# Patient Record
Sex: MALE
[De-identification: names, ages, dates, MRNs, and addresses within clinical notes are randomized per-mention and may not be internally consistent; named-entity substitution may affect disease eponyms.]

---

## 2018-02-22 ENCOUNTER — HOSPITAL ENCOUNTER (OUTPATIENT)
Dept: HOSPITAL 14 - H.ER | Age: 83
Setting detail: OBSERVATION
LOS: 1 days | Discharge: HOME | End: 2018-02-23
Attending: INTERNAL MEDICINE | Admitting: INTERNAL MEDICINE
Payer: MEDICARE

## 2018-02-22 DIAGNOSIS — E66.9: ICD-10-CM

## 2018-02-22 DIAGNOSIS — T78.3XXA: Primary | ICD-10-CM

## 2018-02-22 DIAGNOSIS — J44.9: ICD-10-CM

## 2018-02-22 DIAGNOSIS — I10: ICD-10-CM

## 2018-02-22 DIAGNOSIS — C61: ICD-10-CM

## 2018-02-22 DIAGNOSIS — E86.0: ICD-10-CM

## 2018-02-22 DIAGNOSIS — T46.4X5A: ICD-10-CM

## 2018-02-22 DIAGNOSIS — Z23: ICD-10-CM

## 2018-02-22 DIAGNOSIS — Z85.46: ICD-10-CM

## 2018-02-22 LAB
BASOPHILS # BLD AUTO: 0.1 K/UL (ref 0–0.2)
BASOPHILS NFR BLD: 0.6 % (ref 0–2)
BUN SERPL-MCNC: 21 MG/DL (ref 9–20)
CALCIUM SERPL-MCNC: 9.2 MG/DL (ref 8.4–10.2)
EOSINOPHIL # BLD AUTO: 0.1 K/UL (ref 0–0.7)
EOSINOPHIL NFR BLD: 1.5 % (ref 0–4)
ERYTHROCYTE [DISTWIDTH] IN BLOOD BY AUTOMATED COUNT: 13.1 % (ref 11.5–14.5)
GFR NON-AFRICAN AMERICAN: 58
HGB BLD-MCNC: 14.8 G/DL (ref 12–18)
LYMPHOCYTES # BLD AUTO: 2.8 K/UL (ref 1–4.3)
LYMPHOCYTES NFR BLD AUTO: 32.8 % (ref 20–40)
MCH RBC QN AUTO: 33.1 PG (ref 27–31)
MCHC RBC AUTO-ENTMCNC: 34 G/DL (ref 33–37)
MCV RBC AUTO: 97.5 FL (ref 80–94)
MONOCYTES # BLD: 0.5 K/UL (ref 0–0.8)
MONOCYTES NFR BLD: 6.2 % (ref 0–10)
NEUTROPHILS # BLD: 5 K/UL (ref 1.8–7)
NEUTROPHILS NFR BLD AUTO: 58.9 % (ref 50–75)
NRBC BLD AUTO-RTO: 0.1 % (ref 0–0)
PLATELET # BLD: 204 K/UL (ref 130–400)
PMV BLD AUTO: 8.7 FL (ref 7.2–11.7)
RBC # BLD AUTO: 4.47 MIL/UL (ref 4.4–5.9)
WBC # BLD AUTO: 8.5 K/UL (ref 4.8–10.8)

## 2018-02-22 PROCEDURE — 96375 TX/PRO/DX INJ NEW DRUG ADDON: CPT

## 2018-02-22 PROCEDURE — 85025 COMPLETE CBC W/AUTO DIFF WBC: CPT

## 2018-02-22 PROCEDURE — 96374 THER/PROPH/DIAG INJ IV PUSH: CPT

## 2018-02-22 PROCEDURE — 99285 EMERGENCY DEPT VISIT HI MDM: CPT

## 2018-02-22 PROCEDURE — 93005 ELECTROCARDIOGRAM TRACING: CPT

## 2018-02-22 PROCEDURE — 94640 AIRWAY INHALATION TREATMENT: CPT

## 2018-02-22 PROCEDURE — 96376 TX/PRO/DX INJ SAME DRUG ADON: CPT

## 2018-02-22 PROCEDURE — 80048 BASIC METABOLIC PNL TOTAL CA: CPT

## 2018-02-22 PROCEDURE — 96372 THER/PROPH/DIAG INJ SC/IM: CPT

## 2018-02-22 RX ADMIN — METHYLPREDNISOLONE SODIUM SUCCINATE SCH MG: 40 INJECTION, POWDER, FOR SOLUTION INTRAMUSCULAR; INTRAVENOUS at 23:41

## 2018-02-22 NOTE — CP.PCM.HP
History of Present Illness





- History of Present Illness


History of Present Illness: 


PMD: Alona Akbar MD





Chief complaint: Swollen upper lip with rash to Trunk





The Patient was seen and examined in the ED





HPI: 82 years old male, living Alone with hx of HTN treated with Lisinopril, 

Prostate Ca and Asthma, came with one day of swelling of the upper lip, not 

associated with difficulty to breath, swallow nor speak. The tongue nor throat 

were swollen. He did refer a pruritus rash about his neck and upper chest with 

mild wheezes. He refers no new foods nor medication and has never had lip 

swelling in the past. In the ED he was treated with Epinephrine , Benadryl, 

Methylprednisolone  and Pepcid which showed some improvement initially. The 

plan was to discharge the patient with plan of cessation of the ACE inhibitor 

and to start Norvasc, but because of persistence of the upper lip swelling and 

and renewed enlargening, decision was made to maintain the patient in Hospital 

for Observation. 








PMH: HTN; Asthma; Prostate Cancer





PSH: Umbilical Hernia repair





SH: Denies Smoking: No Alcohol use; no illegal drug use; Live Alone





FH: State: no known family hx





Allergies NKDA





Medication: Reviewed

















Present on Admission





- Present on Admission


Any Indicators Present on Admission: No


History of DVT/PE: No


History of Uncontrolled Diabetes: No


Urinary Catheter: No


Decubitus Ulcer Present: No





Review of Systems





- Constitutional


Constitutional: absent: Anorexia, Chills, Fatigue, Fever, Headache, Lethargy





- EENT


Eyes: absent: Diplopia, Floaters, Requires Corrective Lenses, Sees Flashes


Ears: absent: Decreased Hearing, Ear Discharge, Ear Pain


Nose/Mouth/Throat: Lip Swelling.  absent: Epistaxis, Nasal Congestion, Nasal 

Discharge





- Cardiovascular


Cardiovascular: Dyspnea.  absent: Chest Pain, Leg Edema, Pedal Edema


Additional comments: 





SOB on exertion





- Respiratory


Respiratory: Dyspnea, Wheezing.  absent: Cough





- Gastrointestinal


Gastrointestinal: absent: Constipation, Diarrhea, Nausea, Vomiting





- Genitourinary


Genitourinary: absent: Dysuria, Flank Pain, Hematuria, Urinary Frequency





- Musculoskeletal


Musculoskeletal: absent: Back Pain, Muscle Cramps, Myalgias





- Integumentary


Integumentary: Rash.  absent: Pruritus, Skin Ulcer, Sores, Striae


Additional comments: 





Upper lip swelling


Pruritic rash on upper chest and neck





- Neurological


Neurological: absent: Confusion, Dizziness, Focal Weakness, Weakness





- Psychiatric


Psychiatric: absent: Anxiety, Depression, Panic Attacks





- Endocrine


Endocrine: absent: Palpitations, Polydipsia, Polyphagia, Polyuria





- Hematologic/Lymphatic


Hematologic: absent: Easy Bleeding, Easy Bruising





Past Patient History





- Past Medical History & Family History


Past Medical History?: Yes





- Past Social History


Smoking Status: Never Smoked


Chewing Tobacco Use: No


Cigar Use: No


Alcohol: None


Drugs: Denies


Home Situation {Lives}: Alone





- CARDIAC


Hx Hypertension: Yes





- PULMONARY


Hx Chronic Obstructive Pulmonary Disease (COPD): Yes





- NEUROLOGICAL


Hx Neurological Disorder: No





- HEENT


Hx HEENT Problems: No





- RENAL


Hx Chronic Kidney Disease: No





- ENDOCRINE/METABOLIC


Hx Endocrine Disorders: No





- HEMATOLOGICAL/ONCOLOGICAL


Hx Blood Disorders: No





- INTEGUMENTARY


Hx Dermatological Problems: No





- MUSCULOSKELETAL/RHEUMATOLOGICAL


Hx Musculoskeletal Disorders: No





- GASTROINTESTINAL


Hx Gastrointestinal Disorders: No





- GENITOURINARY/GYNECOLOGICAL


Hx Genitourinary Disorders: No





- PSYCHIATRIC


Hx Psychophysiologic Disorder: No


Hx Substance Use: No





- SURGICAL HISTORY


Hx Herniorrhaphy: Yes





- ANESTHESIA


Hx Anesthesia: Yes


Hx Anesthesia Reactions: No





Meds


Allergies/Adverse Reactions: 


 Allergies











Allergy/AdvReac Type Severity Reaction Status Date / Time


 


No Known Allergies Allergy   Verified 02/22/18 14:25














Physical Exam





- Constitutional


Appears: No Acute Distress





- Head Exam


Head Exam: ATRAUMATIC, NORMAL INSPECTION, NORMOCEPHALIC





- Eye Exam


Eye Exam: EOMI, Normal appearance


Pupil Exam: NORMAL ACCOMODATION, PERRL





- ENT Exam


ENT Exam: Mucous Membranes Moist


Additional comments: 





Mouth with upper lip swollen, non tender, no erythemia.





- Neck Exam


Neck exam: Positive for: Full Rom, Normal Inspection.  Negative for: 

Lymphadenopathy, Tenderness





- Respiratory Exam


Additional comments: 





Mild wheezes at both lung fields


No rales nor rhonchi





- Cardiovascular Exam


Cardiovascular Exam: REGULAR RHYTHM, RRR, +S1, +S2


Additional comments: 





Systolic murmur III/VI at the base





- GI/Abdominal Exam


Additional comments: 





Obese abdomen, firm, non-tender, no viceromegales





- Rectal Exam


Rectal Exam: Deferred





- Extremities Exam


Extremities exam: Positive for: full ROM, normal inspection.  Negative for: 

calf tenderness, joint swelling, pedal edema





- Back Exam


Back exam: NORMAL INSPECTION.  absent: CVA tenderness (L), CVA tenderness (R)





- Neurological Exam


Neurological exam: Alert, CN II-XII Intact, Oriented x3, Reflexes Normal





- Psychiatric Exam


Psychiatric exam: Normal Affect, Normal Mood





- Skin


Skin Exam: Dry, Intact, Normal Color, Rash, Warm


Additional comments: 





Erythematos rash on trunk as per ED not seen by me





Results





- Vital Signs


Recent Vital Signs: 





 Last Vital Signs











Temp  98.0 F   02/22/18 14:25


 


Pulse  76   02/22/18 18:33


 


Resp  18   02/22/18 18:33


 


BP  137/88   02/22/18 18:33


 


Pulse Ox  99   02/22/18 19:28














- Labs


Result Diagrams: 


 02/22/18 17:25





 02/22/18 17:25


Labs: 





 Laboratory Results - last 24 hr











  02/22/18 02/22/18





  17:25 17:25


 


WBC  8.5 


 


RBC  4.47 


 


Hgb  14.8 


 


Hct  43.6 


 


MCV  97.5 H 


 


MCH  33.1 H 


 


MCHC  34.0 


 


RDW  13.1 


 


Plt Count  204 


 


MPV  8.7 


 


Neut % (Auto)  58.9 


 


Lymph % (Auto)  32.8 


 


Mono % (Auto)  6.2 


 


Eos % (Auto)  1.5 


 


Baso % (Auto)  0.6 


 


Neut # (Auto)  5.0 


 


Lymph # (Auto)  2.8 


 


Mono # (Auto)  0.5 


 


Eos # (Auto)  0.1 


 


Baso # (Auto)  0.1 


 


Sodium   138


 


Potassium   4.5


 


Chloride   101


 


Carbon Dioxide   24


 


Anion Gap   18


 


BUN   21 H


 


Creatinine   1.2


 


Est GFR ( Amer)   > 60


 


Est GFR (Non-Af Amer)   58


 


Random Glucose   82


 


Calcium   9.2














Assessment & Plan





- Assessment and Plan (Free Text)


Assessment: 





#. Angeoedema


#. Dehydration


#. HTN


#. Asthma


#. Prostate Cancer


Plan: 


82 years old male, living Alone with hx of HTN treated with Lisinopril, 

Prostate Ca and Asthma, came with one day of swelling of the upper lip, with a 

pruritus rash about his neck and upper chest with mild wheezes. In the ED he 

was treated with Epinephrine , Benadryl, Methylprednisolone  and Pepcid which 

showed some improvement initially, but because of persistence of the upper lip 

swelling and renewed enlargening, decision was made to maintain the patient in 

Hospital for Observation. 





#. Angeoedema with signs of an Allergic reaction. consider ACE inhibitor as the 

cause of the Angioedema


- Benadryl


- Methylprednisone


- Monitor Blood pressure, breathing andswallowing





#. Dehydration


- IV Fluid with D5/0.9NS





#. HTN


- D/C Lisinopril


- Norvasc


- Follow blood pressures





#. Asthma


- Albuterol





#. Prostate Cancer


- Continue Casodex





3. DVT Prophylaxis with Lovenox





#. Code Status: Full





- Date & Time


Date: 02/22/18


Time: 21:20

## 2018-02-22 NOTE — ED PDOC
HPI: Allergic Reaction


Time Seen by Provider: 02/22/18 14:36


Chief Complaint (Nursing): Allergic Reaction


Chief Complaint (Provider): Allergic reaction


History Per: Patient


History/Exam Limitations: no limitations


Onset/Duration Of Symptoms: Hrs (this morning)


Current Symptoms Are (Timing): Still Present


Associated Symptoms: Skin Rash (trunk), Swelling (upper lip), Itching, Redness (

trunk).  denies: Trouble Swallowing, Chest Pain


Home/EMS Treatment: None


Additional Complaint(s): 





Ryan Velasquez is an 82 year old male, with a past medical history of 

hypertension, who presents to the emergency department complaining of upper lip 

swelling and itchy rash on trunk onset since this morning. Patient states he 

woke up and noticed the swelling and redness on trunk. He denies drinking, 

eating or using anything new. Patient reports he is currently taking 

lisinopril. He denies any tongue or throat swelling, difficulty breathing or 

swallowing, chest pain, cough or other medical complaints.





PMD: Dr. Raffy Sage ([facility])





Past Medical History


Reviewed: Historical Data, Nursing Documentation, Vital Signs


Vital Signs: 


 Last Vital Signs











Temp  98.0 F   02/22/18 14:25


 


Pulse  88   02/22/18 15:09


 


Resp  18   02/22/18 15:09


 


BP  175/113 H  02/22/18 14:25


 


Pulse Ox  99   02/22/18 15:09














- Medical History


PMH: COPD, HTN





- Surgical History


Surgical History: Hernia Repair





- Family History


Family History: States: Unknown Family Hx





- Social History


Current smoker - smoking cessation education provided: No


Alcohol: None


Drugs: Denies





- Home Medications


Home Medications: 


 Ambulatory Orders











 Medication  Instructions  Recorded


 


Albuterol HFA [Ventolin HFA 90 2 puff IH Q6H PRN 02/22/18





mcg/actuation (8 g)]  


 


Bicalutamide [Casodex] 50 mg PO DAILY 02/22/18


 


Fluticasone Propionate [Flovent 2 puff IH Q12 02/22/18





Hfa]  


 


Tamsulosin [Flomax] 0.4 mg PO DAILY 02/22/18


 


amLODIPine [Norvasc] 5 mg PO DAILY #30 tab 02/23/18














- Allergies


Allergies/Adverse Reactions: 


 Allergies











Allergy/AdvReac Type Severity Reaction Status Date / Time


 


No Known Allergies Allergy   Verified 02/22/18 14:25














Review of Systems


ROS Statement: Except As Marked, All Systems Reviewed And Found Negative


ENT: Positive for: Mouth Swelling (upper lip swollen. ).  Negative for: Throat 

Swelling (difficulty swallowing.), Other (tongue swelling. )


Cardiovascular: Negative for: Chest Pain


Respiratory: Negative for: Cough, Shortness of Breath


Skin: Positive for: Rash (redness on trunk)





Physical Exam





- Reviewed


Nursing Documentation Reviewed: Yes


Vital Signs Reviewed: Yes





- Physical Exam


Appears: Positive for: Non-toxic


Head Exam: Positive for: ATRAUMATIC, NORMAL INSPECTION, NORMOCEPHALIC


Skin: Positive for: Warm, Dry, Rash (limited patchy urticaria-like rash on trunk

)


Eye Exam: Positive for: Normal appearance, EOMI, PERRL


ENT: Positive for: Other (upper lip swelling. No tongue swelling, throat is 

clear. No edema, no pharyngeal or tonsil edema.).  Negative for: Tonsillar 

Swelling


Neck: Positive for: Painless ROM, Supple


Cardiovascular/Chest: Positive for: Regular Rate, Rhythm.  Negative for: Murmur


Respiratory: Positive for: Wheezing (diffused b/l).  Negative for: Respiratory 

Distress


Gastrointestinal/Abdominal: Positive for: Normal Exam, Soft.  Negative for: 

Tenderness, Guarding, Rebound


Back: Positive for: Normal Inspection.  Negative for: L CVA Tenderness, R CVA 

Tenderness, Vertebral Tenderness


Extremity: Positive for: Normal ROM.  Negative for: Tenderness, Deformity, 

Swelling


Neurologic/Psych: Positive for: Alert, Oriented





- Laboratory Results


Result Diagrams: 


 02/22/18 17:25





 02/22/18 17:25





- ECG


O2 Sat by Pulse Oximetry: 99 (RA)


Pulse Ox Interpretation: Normal





Disposition





- Clinical Impression


Clinical Impression: 


 Angioedema due to angiotensin converting enzyme inhibitor (ACE-I), Urticaria, 

Wheezing, Anaphylactic reaction








- Patient ED Disposition


Is Patient to be Admitted: Transfer of Care


Counseled Patient/Family Regarding: Studies Performed, Diagnosis





- Disposition


Disposition: Transfer of Care


Disposition Time: 19:00


Condition: STABLE


Patient Signed Over To: Benjamin Song





Medical Decision Making


Medical Decision Making: 





Initial Impression: angioedema of the lip, urticaria, and wheezing. 

Differential includes but not limited to anaphylaxis, allergic reaction to 

lisinopril or other agent. ACE/ARBs induced angioedema. 





Initial Plan:





--Albuterol 2.5 mg INH


--Benadryl 25 mg IV


--Pepcid 20 mg IVP


--SOLU-medrol 125 mg IVP


--Peak flow pre/post Tx


--Reevaluation





Time: 17:17


Upon reevaulation, patient's wheezing is improved, lip swelling is still present

, and urticaria is still present, but improved. Patient will be admitted for 

anaphylactic reaction and asthma attack. 





Time: 19:00


Patient signed over to Dr. Song pending reevaluation. 








--------------------------------------------------------------------------------

-----------------   


Scribe Attestation:


Documented by Yovani Singer and Abdulaziz Rogel, acting as scribes for Oswaldo Blanco MD





Provider Scribe Attestation:


All medical record entries made by the Scribe were at my direction and 

personally dictated by me. I have reviewed the chart and agree that the record 

accurately reflects my personal performance of the history, physical exam, 

medical decision making, and the department course for this patient. I have 

also personally directed, reviewed, and agree with the discharge instructions 

and disposition.

## 2018-02-22 NOTE — ED PDOC
- Laboratory Results


Result Diagrams: 


 02/22/18 17:25





 02/22/18 17:25





- ECG


O2 Sat by Pulse Oximetry: 99 (RA)





Medical Decision Making


Medical Decision Making: 


Time: 19:00


Patient signed over to me by Dr. Blanco pending reevaluation.





Time: 21:15


Patient was signed out to provider by Dr. Blanco for evaluation.


Upon reevaluation patient has persistent angioedema of the lips. Given patient'

s age, comorbidities, and administration of epinephrine, patient will be 

admitted for observation. 








--------------------------------------------------------------------------------

-----------------


Scribe Attestation:


Documented by Abdulaziz Rogel, acting as a scribe for Benjamin Song MD.





Provider Scribe Attestation:


All medical record entries made by the Scribe were at my direction and 

personally dictated by me. I have reviewed the chart and agree that the record 

accurately reflects my personal performance of the history, physical exam, 

medical decision making, and the department course for this patient. I have 

also personally directed, reviewed, and agree with the discharge instructions 

and disposition.








Disposition





- Clinical Impression


Clinical Impression: 


 Angioedema due to angiotensin converting enzyme inhibitor (ACE-I), Urticaria, 

Wheezing, Anaphylactic reaction








- POA


Present On Arrival: None





- Disposition


Disposition: Hospitalized as Observation Patient


Disposition Time: 21:15


Condition: FAIR

## 2018-02-22 NOTE — CP.PCM.CON
History of Present Illness





- History of Present Illness


History of Present Illness: 





Reason for Consult: eval of lip swelling





HPI: 82 year old male PMH HTN on Lisinopril which he has been taking for years, 

BPH, hx prostate ca, asthma/COPD, presents with a one day history of upper lip 

swelling without any involvement of the tongue or throat. Patient has no 

difficulty breathing or swallowing and states that it has improved since this 

morning. This also presented with some mild urticarial rashes around his neck 

and mild wheezing. These have since resolved. He has received H1/H2 blockers, 

steroids, and a mild dose of epinephrine with improvement. Possible angioedema, 

however patient has been taking Lisinopril for years. This presents more like 

an allergic reaction given the urticaria, however patient denies exposure to 

any new agents/foods. He is HD stable, in no acute distress. As his symptoms 

are resolving, patient is medically stable for discharge home and follow up 

with PCP in a few days. Also recommend cessation of Lisinopril, possible 

adjusting medications to Norvasc with follow up with his primary care 

physician. 





ROS: per HPI all other systems reviewed and negative





PMSH: as above, Hernia repair


FH: denies


SH: denies tobacco, ETOH, IVDU. 


Meds: as below


NKDA











Past Patient History





- Past Social History


Alcohol: None


Drugs: Denies





- CARDIAC


Hx Hypertension: Yes





- PULMONARY


Hx Chronic Obstructive Pulmonary Disease (COPD): Yes





- PSYCHIATRIC


Hx Substance Use: No





- SURGICAL HISTORY


Hx Herniorrhaphy: Yes





- ANESTHESIA


Hx Anesthesia: Yes


Hx Anesthesia Reactions: No





Meds


Allergies/Adverse Reactions: 


 Allergies











Allergy/AdvReac Type Severity Reaction Status Date / Time


 


No Known Allergies Allergy   Verified 02/22/18 14:25














- Medications


Medications: 


 Current Medications





Epinephrine HCl (Epinephrine)  0.1 mg SC ONCE ONE


   Stop: 02/22/18 17:55











Physical Exam





- Constitutional


Appears: Non-toxic, No Acute Distress





- Head Exam


Head Exam: ATRAUMATIC, NORMOCEPHALIC





- Eye Exam


Eye Exam: EOMI, Normal appearance


Pupil Exam: NORMAL ACCOMODATION





- ENT Exam


ENT Exam: Mucous Membranes Moist, Normal Oropharynx


Additional comments: 





upper lip swelling, improving.





- Respiratory Exam


Respiratory Exam: Clear to Auscultation Bilateral, NORMAL BREATHING PATTERN.  

absent: Rales, Rhonchi, Wheezes





- Cardiovascular Exam


Cardiovascular Exam: RRR, +S1, +S2.  absent: Irregular Rhythm





- GI/Abdominal Exam


GI & Abdominal Exam: Normal Bowel Sounds, Soft.  absent: Mass, Organomegaly, 

Tenderness





- Extremities Exam


Extremities exam: Positive for: normal capillary refill, normal inspection.  

Negative for: calf tenderness





- Back Exam


Back exam: absent: CVA tenderness (L), CVA tenderness (R)





- Neurological Exam


Neurological exam: Alert, Oriented x3





- Psychiatric Exam


Psychiatric exam: Normal Affect, Normal Mood





- Skin


Skin Exam: Dry, Warm





Results





- Vital Signs


Recent Vital Signs: 


 Last Vital Signs











Temp  98.0 F   02/22/18 14:25


 


Pulse  88   02/22/18 15:09


 


Resp  18   02/22/18 15:09


 


BP  175/113 H  02/22/18 14:25


 


Pulse Ox  99   02/22/18 17:26














- Labs


Result Diagrams: 


 02/22/18 17:25





 02/22/18 17:25


Labs: 


 Laboratory Results - last 24 hr











  02/22/18





  17:25


 


Sodium  138


 


Potassium  4.5


 


Chloride  101


 


Carbon Dioxide  24


 


Anion Gap  18


 


BUN  21 H


 


Creatinine  1.2


 


Est GFR ( Amer)  > 60


 


Est GFR (Non-Af Amer)  58


 


Random Glucose  82


 


Calcium  9.2














Assessment & Plan





- Assessment and Plan (Free Text)


Plan: 








ASSESSMENT AND PLAN:





Lip swelling likely secondary to allergic reaction vs angioedema


HTN


Asthma/COPD


BPH


Hx Prostate Ca





82 year old male PMH HTN on Lisinopril which he has been taking for years, BPH, 

hx prostate ca, asthma/COPD, presents with a one day history of upper lip 

swelling without any involvement of the tongue or throat. Patient has no 

difficulty breathing or swallowing and states that it has improved since this 

morning. This also presented with some mild urticarial rashes around his neck 

and mild wheezing. These have since resolved. He has received H1/H2 blockers, 

steroids, and a mild dose of epinephrine with improvement. Possible angioedema, 

however patient has been taking Lisinopril for years. This presents more like 

an allergic reaction given the urticaria, however patient denies exposure to 

any new agents/foods. He is HD stable, in no acute distress. As his symptoms 

are resolving, patient is medically stable for discharge home and follow up 

with PCP in a few days. Also recommend cessation of Lisinopril, possible 

adjusting medications to Norvasc with follow up with his primary care 

physician.

## 2018-02-23 VITALS
DIASTOLIC BLOOD PRESSURE: 71 MMHG | SYSTOLIC BLOOD PRESSURE: 119 MMHG | OXYGEN SATURATION: 95 % | TEMPERATURE: 98.4 F | HEART RATE: 90 BPM

## 2018-02-23 VITALS — RESPIRATION RATE: 18 BRPM

## 2018-02-23 RX ADMIN — METHYLPREDNISOLONE SODIUM SUCCINATE SCH MG: 40 INJECTION, POWDER, FOR SOLUTION INTRAMUSCULAR; INTRAVENOUS at 08:34

## 2018-02-23 RX ADMIN — METHYLPREDNISOLONE SODIUM SUCCINATE SCH MG: 40 INJECTION, POWDER, FOR SOLUTION INTRAMUSCULAR; INTRAVENOUS at 05:38

## 2018-02-23 NOTE — CARD
--------------- APPROVED REPORT --------------





EKG Measurement

Heart [id number]

CA [id number]

[id number]

[id number]

[id number]



<Conclusion>

Normal sinus rhythm

Left bundle branch block

Abnormal ECG

## 2018-02-23 NOTE — CP.PCM.DIS
Provider





- Provider


Date of Admission: 


02/22/18 20:38





Attending physician: 


Jaylen Laguerre





Time Spent in preparation of Discharge (in minutes): 20





Diagnosis





- Discharge Diagnosis


(1) Angioedema due to angiotensin converting enzyme inhibitor (ACE-I)


Status: Acute   





(2) Dehydration


Status: Acute   





(3) HTN (hypertension)


Status: Chronic   





(4) Asthma


Status: Chronic   





(5) Prostate cancer


Status: Chronic   





Hospital Course





- Lab Results


Lab Results: 


 Most Recent Lab Values











WBC  8.5 K/uL (4.8-10.8)   02/22/18  17:25    


 


RBC  4.47 Mil/uL (4.40-5.90)   02/22/18  17:25    


 


Hgb  14.8 g/dL (12.0-18.0)   02/22/18  17:25    


 


Hct  43.6 % (35.0-51.0)   02/22/18  17:25    


 


MCV  97.5 fl (80.0-94.0)  H  02/22/18  17:25    


 


MCH  33.1 pg (27.0-31.0)  H  02/22/18  17:25    


 


MCHC  34.0 g/dL (33.0-37.0)   02/22/18  17:25    


 


RDW  13.1 % (11.5-14.5)   02/22/18  17:25    


 


Plt Count  204 K/uL (130-400)   02/22/18  17:25    


 


MPV  8.7 fl (7.2-11.7)   02/22/18  17:25    


 


Neut % (Auto)  58.9 % (50.0-75.0)   02/22/18  17:25    


 


Lymph % (Auto)  32.8 % (20.0-40.0)   02/22/18  17:25    


 


Mono % (Auto)  6.2 % (0.0-10.0)   02/22/18  17:25    


 


Eos % (Auto)  1.5 % (0.0-4.0)   02/22/18  17:25    


 


Baso % (Auto)  0.6 % (0.0-2.0)   02/22/18  17:25    


 


Neut # (Auto)  5.0 K/uL (1.8-7.0)   02/22/18  17:25    


 


Lymph # (Auto)  2.8 K/uL (1.0-4.3)   02/22/18  17:25    


 


Mono # (Auto)  0.5 K/uL (0.0-0.8)   02/22/18  17:25    


 


Eos # (Auto)  0.1 K/uL (0.0-0.7)   02/22/18  17:25    


 


Baso # (Auto)  0.1 K/uL (0.0-0.2)   02/22/18  17:25    


 


Sodium  138 mmol/l (132-148)   02/22/18  17:25    


 


Potassium  4.5 MMOL/L (3.6-5.0)   02/22/18  17:25    


 


Chloride  101 mmol/L ()   02/22/18  17:25    


 


Carbon Dioxide  24 mmol/L (22-30)   02/22/18  17:25    


 


Anion Gap  18  (10-20)   02/22/18  17:25    


 


BUN  21 mg/dl (9-20)  H  02/22/18  17:25    


 


Creatinine  1.2 mg/dl (0.8-1.5)   02/22/18  17:25    


 


Est GFR ( Amer)  > 60   02/22/18  17:25    


 


Est GFR (Non-Af Amer)  58   02/22/18  17:25    


 


Random Glucose  82 mg/dL ()   02/22/18  17:25    


 


Calcium  9.2 mg/dL (8.4-10.2)   02/22/18  17:25    














- Hospital Course


Hospital Course: 





82 y.o male with PMHx of BPH, prostate cancer, asthma/COPD presented to the ED 

with upper lip swelling without involvement of tongue or throat and urticarial 

rashes around neck and as well as wheezing. Patient's angioedema of lip, 

urticaria, and wheezing most likely an allergic reaction to Lisinopril although 

has been taking Lisinopril for years. No difficulty breathing or swallowing. 

Denies food or drug allergies on admission, denies trying nothing. Patient 

given albuterol, Benadryl, Pepcid, and started on Methylprednisone IV with 

symptoms improved initially.  Decision was made to discharge patient in the ED, 

stop ACE inhibitor and start Norvasc, however patient noted to be persistence 

upper lip swelling and renewed enlarging and decision made to admit patient for 

closer observation.  During hospital stay, patient started Norvasc and ACE 

inhibitor was discontinued, and continued Methylprednisone and Benadryl.  

Patient improved clinically with wheezing improved, no rash, and diminished lip 

swelling. Patient hemodynamically and medically stable, and decision was made 

to discharge home with follow up with PCP.





Discharge Exam





- Head Exam


Head Exam: ATRAUMATIC, NORMAL INSPECTION, NORMOCEPHALIC





- Eye Exam


Eye Exam: EOMI, Normal appearance, PERRL


Pupil Exam: NORMAL ACCOMODATION, PERRL





- ENT Exam


ENT Exam: Mucous Membranes Moist, Normal Exam


Additional comments: 





 no nose obstruction, no nasal congestion or nasal discharge





- Respiratory Exam


Respiratory Exam: absent: Rales, Rhonchi, Respiratory Distress, Stridor


Additional comments: 





improved wheezing b/l


wheezing L>R


less SOB on exertion





- Cardiovascular Exam


Cardiovascular Exam: REGULAR RHYTHM, +S1, +S2


Additional comments: 





Systolic murmur III/VI at the base





- GI/Abdominal Exam


GI & Abdominal Exam: Normal Bowel Sounds, Unremarkable


Additional comments: 





Obese abdomen, firm, non-tender, no visceromegaly





- Extremities Exam


Extremities exam: normal inspection


Additional comments: 





No edema to the LE





- Back Exam


Back exam: NORMAL INSPECTION.  absent: CVA tenderness (L), CVA tenderness (R), 

rash noted





- Neurological Exam


Neurological exam: Alert, Oriented x3





- Psychiatric Exam


Psychiatric exam: Normal Affect, Normal Mood





- Skin


Skin Exam: Dry, Intact, Normal Color, Warm





- Additional Findings


Additional findings: 





Diminished upper lip swelling


No swelling to tongue


No swelling to lower lip


No rash noted to upper chest or neck





Discharge Plan





- Discharge Medications


Prescriptions: 


amLODIPine [Norvasc] 5 mg PO DAILY #30 tab





- Follow Up Plan


Condition: STABLE


Disposition: HOME/ ROUTINE


Instructions:  Angioedema, Angioedema (DC), Urticaria (GEN), Hypertension (GEN)

, Amlodipine


Additional Instructions: 


Start Amlodipine


Discontinue Lisinopril


Rx amlodipine


f/u with PCP within 1 week